# Patient Record
Sex: MALE | Race: WHITE | NOT HISPANIC OR LATINO | Employment: FULL TIME | URBAN - METROPOLITAN AREA
[De-identification: names, ages, dates, MRNs, and addresses within clinical notes are randomized per-mention and may not be internally consistent; named-entity substitution may affect disease eponyms.]

---

## 2018-06-09 ENCOUNTER — NURSE TRIAGE (OUTPATIENT)
Dept: ADMINISTRATIVE | Facility: CLINIC | Age: 53
End: 2018-06-09

## 2018-06-10 NOTE — TELEPHONE ENCOUNTER
Lower leg pain, getting dizzy when standing. BP 82/54 HR= ?   Rec EMS due to low BP and dizzy. Pt states states family will take him to ED. rec EMS if feeling too weak to stand/faint.   No PCP on file   Reason for Disposition   [1] Systolic BP < 90 AND [2] dizzy, lightheaded, or weak    Protocols used: ST LOW BLOOD PRESSURE-A-AH

## 2019-06-21 ENCOUNTER — OFFICE VISIT (OUTPATIENT)
Dept: WOUND CARE | Facility: HOSPITAL | Age: 54
End: 2019-06-21
Attending: NURSE PRACTITIONER
Payer: COMMERCIAL

## 2019-06-21 VITALS
SYSTOLIC BLOOD PRESSURE: 159 MMHG | TEMPERATURE: 98 F | RESPIRATION RATE: 20 BRPM | DIASTOLIC BLOOD PRESSURE: 92 MMHG | HEART RATE: 98 BPM

## 2019-06-21 DIAGNOSIS — L97.509 TYPE 2 DIABETES MELLITUS WITH FOOT ULCER, WITHOUT LONG-TERM CURRENT USE OF INSULIN: ICD-10-CM

## 2019-06-21 DIAGNOSIS — E11.621 TYPE 2 DIABETES MELLITUS WITH FOOT ULCER, WITHOUT LONG-TERM CURRENT USE OF INSULIN: ICD-10-CM

## 2019-06-21 DIAGNOSIS — L97.412 MIDFOOT ULCERATION, RIGHT, WITH FAT LAYER EXPOSED: ICD-10-CM

## 2019-06-21 DIAGNOSIS — L97.422 MIDFOOT ULCER, LEFT, WITH FAT LAYER EXPOSED: ICD-10-CM

## 2019-06-21 PROCEDURE — 97597 DBRDMT OPN WND 1ST 20 CM/<: CPT

## 2019-06-21 PROCEDURE — 99204 OFFICE O/P NEW MOD 45 MIN: CPT | Mod: 25

## 2019-06-21 RX ORDER — METFORMIN HYDROCHLORIDE 1000 MG/1
1000 TABLET ORAL 2 TIMES DAILY WITH MEALS
COMMUNITY

## 2019-06-21 RX ORDER — LOSARTAN POTASSIUM 100 MG/1
100 TABLET ORAL DAILY
COMMUNITY

## 2019-06-21 RX ORDER — ASPIRIN 81 MG/1
81 TABLET ORAL DAILY
COMMUNITY

## 2019-06-21 RX ORDER — CARVEDILOL 25 MG/1
25 TABLET ORAL 2 TIMES DAILY WITH MEALS
COMMUNITY

## 2019-06-21 RX ORDER — GABAPENTIN 300 MG/1
300 CAPSULE ORAL 3 TIMES DAILY
COMMUNITY

## 2019-06-21 NOTE — PROGRESS NOTES
[facility]  Wound Care  Progress Note    Problem List Items Addressed This Visit        Derm    Midfoot ulceration, right, with fat layer exposed    Midfoot ulcer, left, with fat layer exposed       Endocrine    Type 2 diabetes mellitus with foot ulcer, without long-term current use of insulin    Overview     HPI: 53 yr old male with history of DM-2, the bottoms of both of his feet are peeling with cracks in his feet    Location: left and right plantar mid foot    Duration: right: 5-24-19 and left: 6-17-19    Context: DFU    Associated Signs & Symptoms: denies pain    Timing: n/a    Severity: n/a    Quality: n/a    Modifying Factors: n/a             Relevant Medications    metFORMIN (GLUCOPHAGE) 1000 MG tablet         Physical Exam   Constitutional: He is oriented to person, place, and time. He appears well-developed and well-nourished.   HENT:   Head: Normocephalic.   Pulmonary/Chest: Effort normal.   Musculoskeletal: Normal range of motion.   Neurological: He is alert and oriented to person, place, and time.   Skin: Skin is warm and dry.   DFU's, perez grade 1's to left and right plantar mid feet, both with adipose exposed but shallow, pt is not on insulin   Psychiatric: He has a normal mood and affect. His behavior is normal. Judgment and thought content normal.   Vitals reviewed.    DNA culture obtained to right plantar foot, will e-scribe lotrisone cream to surface of feet and iodoflex to wound beds, keep clean and dry and will recheck in one week.  See wound doc progress notes. Documents will be scanned.        Bruna Mcgraw  [facility]

## 2019-06-28 ENCOUNTER — OFFICE VISIT (OUTPATIENT)
Dept: WOUND CARE | Facility: HOSPITAL | Age: 54
End: 2019-06-28
Attending: NURSE PRACTITIONER
Payer: COMMERCIAL

## 2019-06-28 VITALS — DIASTOLIC BLOOD PRESSURE: 96 MMHG | SYSTOLIC BLOOD PRESSURE: 189 MMHG | HEART RATE: 84 BPM

## 2019-06-28 DIAGNOSIS — L97.422 MIDFOOT ULCER, LEFT, WITH FAT LAYER EXPOSED: ICD-10-CM

## 2019-06-28 DIAGNOSIS — E11.621 TYPE 2 DIABETES MELLITUS WITH FOOT ULCER, WITHOUT LONG-TERM CURRENT USE OF INSULIN: Primary | ICD-10-CM

## 2019-06-28 DIAGNOSIS — L97.412 MIDFOOT ULCERATION, RIGHT, WITH FAT LAYER EXPOSED: ICD-10-CM

## 2019-06-28 DIAGNOSIS — L97.509 TYPE 2 DIABETES MELLITUS WITH FOOT ULCER, WITHOUT LONG-TERM CURRENT USE OF INSULIN: Primary | ICD-10-CM

## 2019-06-28 PROCEDURE — 99213 OFFICE O/P EST LOW 20 MIN: CPT

## 2019-06-28 NOTE — PROGRESS NOTES
[facility]  Wound Care  Progress Note    Problem List Items Addressed This Visit        Derm    Midfoot ulceration, right, with fat layer exposed    Midfoot ulcer, left, with fat layer exposed       Endocrine    Type 2 diabetes mellitus with foot ulcer, without long-term current use of insulin - Primary    Overview     HPI: 53 yr old male with history of DM-2, the bottoms of both of his feet are peeling with cracks in his feet    Location: left and right plantar mid foot    Duration: right: 5-24-19 and left: 6-17-19    Context: DFU    Associated Signs & Symptoms: denies pain    Timing: n/a    Severity: n/a    Quality: n/a    Modifying Factors: n/a    6-28-19: here for wound care for DFU's to plantar feet              Physical Exam   Constitutional: He is oriented to person, place, and time. He appears well-developed and well-nourished.   HENT:   Head: Normocephalic.   Pulmonary/Chest: Effort normal.   Musculoskeletal: Normal range of motion.   Neurological: He is alert and oriented to person, place, and time.   Skin: Skin is warm and dry.   DFU's, both perez grade 1's to left and right plantar feet, superficial today.   Psychiatric: He has a normal mood and affect. His behavior is normal. Judgment and thought content normal.   Vitals reviewed.    Feet are better today, continue lotrisone cream and will recheck in 2 weeks, culture was negative.  See wound doc progress notes. Documents will be scanned.        Bruna Mcgraw  [facility]

## 2024-03-14 ENCOUNTER — HOSPITAL ENCOUNTER (OUTPATIENT)
Dept: RADIOLOGY | Facility: HOSPITAL | Age: 59
Discharge: HOME | End: 2024-03-14
Attending: SURGERY
Payer: COMMERCIAL

## 2024-03-14 ENCOUNTER — OFFICE VISIT (OUTPATIENT)
Dept: WOUND CARE | Facility: HOSPITAL | Age: 59
End: 2024-03-14
Attending: SURGERY
Payer: COMMERCIAL

## 2024-03-14 VITALS — HEART RATE: 94 BPM | SYSTOLIC BLOOD PRESSURE: 140 MMHG | DIASTOLIC BLOOD PRESSURE: 99 MMHG

## 2024-03-14 DIAGNOSIS — M86.9 DIABETIC FOOT ULCER WITH OSTEOMYELITIS: ICD-10-CM

## 2024-03-14 DIAGNOSIS — L97.509 DIABETIC FOOT ULCER WITH OSTEOMYELITIS: ICD-10-CM

## 2024-03-14 DIAGNOSIS — E11.621 TYPE 2 DIABETES MELLITUS WITH FOOT ULCER, WITHOUT LONG-TERM CURRENT USE OF INSULIN: Primary | ICD-10-CM

## 2024-03-14 DIAGNOSIS — E11.621 DIABETIC FOOT ULCER WITH OSTEOMYELITIS: ICD-10-CM

## 2024-03-14 DIAGNOSIS — L97.522 ULCER OF TOE OF LEFT FOOT, WITH FAT LAYER EXPOSED: ICD-10-CM

## 2024-03-14 DIAGNOSIS — E11.69 DIABETIC FOOT ULCER WITH OSTEOMYELITIS: ICD-10-CM

## 2024-03-14 DIAGNOSIS — L97.509 TYPE 2 DIABETES MELLITUS WITH FOOT ULCER, WITHOUT LONG-TERM CURRENT USE OF INSULIN: Primary | ICD-10-CM

## 2024-03-14 PROCEDURE — 11042 DBRDMT SUBQ TIS 1ST 20SQCM/<: CPT

## 2024-03-14 PROCEDURE — 99214 OFFICE O/P EST MOD 30 MIN: CPT | Mod: 25

## 2024-03-14 PROCEDURE — 73630 X-RAY EXAM OF FOOT: CPT | Mod: 26,LT,, | Performed by: RADIOLOGY

## 2024-03-14 PROCEDURE — 99499 UNLISTED E&M SERVICE: CPT | Mod: ,,, | Performed by: SURGERY

## 2024-03-14 PROCEDURE — 73630 X-RAY EXAM OF FOOT: CPT | Mod: TC,LT

## 2024-03-14 NOTE — PROGRESS NOTES
[facility]  Wound Care  Progress Note    Problem List Items Addressed This Visit       Type 2 diabetes mellitus with foot ulcer, without long-term current use of insulin - Primary    Overview     HPI: 58 yr old male with history of DM-2 Presents with a left plantar diabetic ulcer over the 1st metatarsal head.  Patient states it has been there for about 3 months.  He did see Dr. Cochran and was referred back to wound care.  He was seen here back in 2019 for right foot ulcer.  Recent hemoglobin A1c was 6.7.  He does have good palpable pulses and good ABIs.  I did sharp debridement with curette today to include skin and subcutaneous tissue.  He will need to offload.  Silver alginate.  Left foot x-ray.    Location: left First metatarsal head    Duration:  3 months    Context: DFU    Associated Signs & Symptoms: denies pain    Timing: n/a    Severity: n/a    Quality: n/a    Modifying Factors:  diabetes            Other Visit Diagnoses       Ulcer of toe of left foot, with fat layer exposed        Relevant Orders    X-Ray Foot Complete 3 view Left    Diabetic foot ulcer with osteomyelitis        Relevant Orders    X-Ray Foot Complete 3 view Left            See wound doc progress notes. Documents will be scanned.        Brandon Bee Jr  [facility]

## 2024-03-21 ENCOUNTER — OFFICE VISIT (OUTPATIENT)
Dept: WOUND CARE | Facility: HOSPITAL | Age: 59
End: 2024-03-21
Attending: SURGERY
Payer: COMMERCIAL

## 2024-03-21 VITALS — SYSTOLIC BLOOD PRESSURE: 135 MMHG | HEART RATE: 92 BPM | DIASTOLIC BLOOD PRESSURE: 89 MMHG

## 2024-03-21 DIAGNOSIS — L97.509 TYPE 2 DIABETES MELLITUS WITH FOOT ULCER, WITHOUT LONG-TERM CURRENT USE OF INSULIN: Primary | ICD-10-CM

## 2024-03-21 DIAGNOSIS — E11.621 TYPE 2 DIABETES MELLITUS WITH FOOT ULCER, WITHOUT LONG-TERM CURRENT USE OF INSULIN: Primary | ICD-10-CM

## 2024-03-21 DIAGNOSIS — L97.522 ULCER OF TOE OF LEFT FOOT, WITH FAT LAYER EXPOSED: ICD-10-CM

## 2024-03-21 PROCEDURE — 99499 UNLISTED E&M SERVICE: CPT | Mod: ,,, | Performed by: SURGERY

## 2024-03-21 PROCEDURE — 11042 DBRDMT SUBQ TIS 1ST 20SQCM/<: CPT

## 2024-03-21 NOTE — PROGRESS NOTES
[facility]  Wound Care  Progress Note    Problem List Items Addressed This Visit       Type 2 diabetes mellitus with foot ulcer, without long-term current use of insulin - Primary    Overview     HPI: 58 yr old male with history of DM-2 Presents with a left plantar diabetic ulcer over the 1st metatarsal head.  Patient states it has been there for about 3 months.  He did see Dr. Cochran and was referred back to wound care.  He was seen here back in 2019 for right foot ulcer.  Recent hemoglobin A1c was 6.7.  He does have good palpable pulses and good ABIs.  I did sharp debridement with curette today to include skin and subcutaneous tissue.  He will need to offload.  Silver alginate.  Left foot x-ray.    Location: left First metatarsal head    Duration:  3 months    Context: DFU    Associated Signs & Symptoms: denies pain    Timing: n/a    Severity: n/a    Quality: n/a    Modifying Factors:  diabetes           Current Assessment & Plan     Wound about the same today debridement performed with curette skin and subcu levels to bleeding tissue.  Patient evidently does not have coverage for his insurance for this heal by it over the Internet.  Continue with offloading shoe            See wound doc progress notes. Documents will be scanned.        Alexander Stinson  [facility]

## 2024-03-21 NOTE — ASSESSMENT & PLAN NOTE
Wound about the same today debridement performed with curette skin and subcu levels to bleeding tissue.  Patient evidently does not have coverage for his insurance for this heal by it over the Internet.  Continue with offloading shoe

## 2024-03-28 ENCOUNTER — OFFICE VISIT (OUTPATIENT)
Dept: WOUND CARE | Facility: HOSPITAL | Age: 59
End: 2024-03-28
Attending: SURGERY
Payer: COMMERCIAL

## 2024-03-28 VITALS — DIASTOLIC BLOOD PRESSURE: 85 MMHG | SYSTOLIC BLOOD PRESSURE: 130 MMHG | HEART RATE: 80 BPM

## 2024-03-28 DIAGNOSIS — E11.621 TYPE 2 DIABETES MELLITUS WITH FOOT ULCER, WITHOUT LONG-TERM CURRENT USE OF INSULIN: Primary | ICD-10-CM

## 2024-03-28 DIAGNOSIS — L97.522 ULCER OF TOE OF LEFT FOOT, WITH FAT LAYER EXPOSED: ICD-10-CM

## 2024-03-28 DIAGNOSIS — L97.509 TYPE 2 DIABETES MELLITUS WITH FOOT ULCER, WITHOUT LONG-TERM CURRENT USE OF INSULIN: Primary | ICD-10-CM

## 2024-03-28 PROCEDURE — 11042 DBRDMT SUBQ TIS 1ST 20SQCM/<: CPT

## 2024-03-28 PROCEDURE — 99499 UNLISTED E&M SERVICE: CPT | Mod: ,,, | Performed by: SURGERY

## 2024-03-28 NOTE — PROGRESS NOTES
[facility]  Wound Care  Progress Note    Problem List Items Addressed This Visit       Type 2 diabetes mellitus with foot ulcer, without long-term current use of insulin - Primary    Overview     HPI: 58 yr old male with history of DM-2 Presents with a left plantar diabetic ulcer over the 1st metatarsal head.  Patient states it has been there for about 3 months.  He did see Dr. Cochran and was referred back to wound care.  He was seen here back in 2019 for right foot ulcer.  Recent hemoglobin A1c was 6.7.  He does have good palpable pulses and good ABIs.  I did sharp debridement with curette today to include skin and subcutaneous tissue.  He will need to offload.  Silver alginate.  Left foot x-ray 03/12/2024 did not show any bony erosions or evidence of osteomyelitis.  Continue silver alginate.    Location: left First metatarsal head    Duration:  3 months    Context: DFU    Associated Signs & Symptoms: denies pain    Timing: n/a    Severity: n/a    Quality: n/a    Modifying Factors:  diabetes              See wound doc progress notes. Documents will be scanned.        Brandon Bee Jr  [facility]

## 2024-04-04 ENCOUNTER — OFFICE VISIT (OUTPATIENT)
Dept: WOUND CARE | Facility: HOSPITAL | Age: 59
End: 2024-04-04
Attending: SURGERY
Payer: COMMERCIAL

## 2024-04-04 VITALS — HEART RATE: 82 BPM | SYSTOLIC BLOOD PRESSURE: 130 MMHG | DIASTOLIC BLOOD PRESSURE: 80 MMHG

## 2024-04-04 DIAGNOSIS — L97.509 TYPE 2 DIABETES MELLITUS WITH FOOT ULCER, WITHOUT LONG-TERM CURRENT USE OF INSULIN: Primary | ICD-10-CM

## 2024-04-04 DIAGNOSIS — L97.522 ULCER OF TOE OF LEFT FOOT, WITH FAT LAYER EXPOSED: ICD-10-CM

## 2024-04-04 DIAGNOSIS — E11.621 TYPE 2 DIABETES MELLITUS WITH FOOT ULCER, WITHOUT LONG-TERM CURRENT USE OF INSULIN: Primary | ICD-10-CM

## 2024-04-04 PROCEDURE — 99499 UNLISTED E&M SERVICE: CPT | Mod: ,,, | Performed by: SURGERY

## 2024-04-04 PROCEDURE — 11042 DBRDMT SUBQ TIS 1ST 20SQCM/<: CPT

## 2024-04-04 NOTE — PROGRESS NOTES
[facility]  Wound Care  Progress Note    Problem List Items Addressed This Visit       Type 2 diabetes mellitus with foot ulcer, without long-term current use of insulin - Primary    Overview     HPI: 58 yr old male with history of DM-2 Presents with a left plantar diabetic ulcer over the 1st metatarsal head.  Patient states it has been there for about 3 months.  He did see Dr. Cochran and was referred back to wound care.  He was seen here back in 2019 for right foot ulcer.  Recent hemoglobin A1c was 6.7.  He does have good palpable pulses and good ABIs.  I did sharp debridement with curette today to include skin and subcutaneous tissue.  He will need to offload.  Silver alginate.  Left foot x-ray 03/12/2024 did not show any bony erosions or evidence of osteomyelitis.  Continue silver alginate.    Location: left First metatarsal head    Duration:  3 months    Context: DFU    Associated Signs & Symptoms: denies pain    Timing: n/a    Severity: n/a    Quality: n/a    Modifying Factors:  diabetes           Current Assessment & Plan     Wound improving.  Patient compliant with offloading.  Patient has been using rolling scooter.  Wound bed clean and granulating.  Wound decreased in size.  No real callus formation.  No drainage or concern for infection.  Continued sharp debridement is needed.  Continue offloading.  Importance of diabetic control and offloading discussed with patient.  Continue Promogran            See wound doc progress notes. Documents will be scanned.        Aidan Scott  [facility]

## 2024-04-04 NOTE — ASSESSMENT & PLAN NOTE
Wound improving.  Patient compliant with offloading.  Patient has been using rolling scooter.  Wound bed clean and granulating.  Wound decreased in size.  No real callus formation.  No drainage or concern for infection.  Continued sharp debridement is needed.  Continue offloading.  Importance of diabetic control and offloading discussed with patient.  Continue Promogran

## 2024-04-04 NOTE — PROGRESS NOTES
[facility]  Wound Care  Progress Note    Problem List Items Addressed This Visit    None      See wound doc progress notes. Documents will be scanned.        Aidan PETERS [facility]  [facility] Note    Problem List Items Addressed This Visit    None      See wound doc progress notes. Documents will be scanned.        Aidan PETERS Marino [facility]

## 2024-04-11 ENCOUNTER — OFFICE VISIT (OUTPATIENT)
Dept: WOUND CARE | Facility: HOSPITAL | Age: 59
End: 2024-04-11
Attending: SURGERY
Payer: COMMERCIAL

## 2024-04-11 VITALS
DIASTOLIC BLOOD PRESSURE: 77 MMHG | RESPIRATION RATE: 18 BRPM | TEMPERATURE: 98 F | HEART RATE: 78 BPM | SYSTOLIC BLOOD PRESSURE: 126 MMHG

## 2024-04-11 DIAGNOSIS — E11.621 TYPE 2 DIABETES MELLITUS WITH FOOT ULCER, WITHOUT LONG-TERM CURRENT USE OF INSULIN: Primary | ICD-10-CM

## 2024-04-11 DIAGNOSIS — L97.522 ULCER OF TOE OF LEFT FOOT, WITH FAT LAYER EXPOSED: ICD-10-CM

## 2024-04-11 DIAGNOSIS — L97.509 TYPE 2 DIABETES MELLITUS WITH FOOT ULCER, WITHOUT LONG-TERM CURRENT USE OF INSULIN: Primary | ICD-10-CM

## 2024-04-11 PROCEDURE — 99499 UNLISTED E&M SERVICE: CPT | Mod: ,,, | Performed by: SURGERY

## 2024-04-11 PROCEDURE — 11042 DBRDMT SUBQ TIS 1ST 20SQCM/<: CPT

## 2024-04-11 NOTE — ASSESSMENT & PLAN NOTE
Wound slowly improving.  No necrotic tissue noted.  Curette used to debride skin and subcu levels to bleeding tissue.  Continue with silver product may want to consider switching to collagen in the next week or 2.  If it does not improve consideration to culture.

## 2024-04-11 NOTE — PROGRESS NOTES
[facility]  Wound Care  Progress Note    Problem List Items Addressed This Visit       Type 2 diabetes mellitus with foot ulcer, without long-term current use of insulin - Primary    Overview     HPI: 58 yr old male with history of DM-2 Presents with a left plantar diabetic ulcer over the 1st metatarsal head.  Patient states it has been there for about 3 months.  He did see Dr. Cochran and was referred back to wound care.  He was seen here back in 2019 for right foot ulcer.  Recent hemoglobin A1c was 6.7.  He does have good palpable pulses and good ABIs.  I did sharp debridement with curette today to include skin and subcutaneous tissue.  He will need to offload.  Silver alginate.  Left foot x-ray 03/12/2024 did not show any bony erosions or evidence of osteomyelitis.  Continue silver alginate.    Location: left First metatarsal head    Duration:  3 months    Context: DFU    Associated Signs & Symptoms: denies pain    Timing: n/a    Severity: n/a    Quality: n/a    Modifying Factors:  diabetes           Current Assessment & Plan     Wound slowly improving.  No necrotic tissue noted.  Curette used to debride skin and subcu levels to bleeding tissue.  Continue with silver product may want to consider switching to collagen in the next week or 2.  If it does not improve consideration to culture.         Ulcer of toe of left foot, with fat layer exposed       See wound doc progress notes. Documents will be scanned.        Alexander Stinson  [facility]    
See wound care assessment documentation in chart review. Scanned under media tab.       
0

## 2024-04-18 ENCOUNTER — OFFICE VISIT (OUTPATIENT)
Dept: WOUND CARE | Facility: HOSPITAL | Age: 59
End: 2024-04-18
Attending: SURGERY
Payer: COMMERCIAL

## 2024-04-18 VITALS
RESPIRATION RATE: 18 BRPM | DIASTOLIC BLOOD PRESSURE: 75 MMHG | HEART RATE: 73 BPM | TEMPERATURE: 98 F | SYSTOLIC BLOOD PRESSURE: 123 MMHG

## 2024-04-18 DIAGNOSIS — L97.522 ULCER OF TOE OF LEFT FOOT, WITH FAT LAYER EXPOSED: ICD-10-CM

## 2024-04-18 DIAGNOSIS — L97.509 TYPE 2 DIABETES MELLITUS WITH FOOT ULCER, WITHOUT LONG-TERM CURRENT USE OF INSULIN: Primary | ICD-10-CM

## 2024-04-18 DIAGNOSIS — E11.621 TYPE 2 DIABETES MELLITUS WITH FOOT ULCER, WITHOUT LONG-TERM CURRENT USE OF INSULIN: Primary | ICD-10-CM

## 2024-04-18 PROCEDURE — 99499 UNLISTED E&M SERVICE: CPT | Mod: ,,, | Performed by: SURGERY

## 2024-04-18 PROCEDURE — 11042 DBRDMT SUBQ TIS 1ST 20SQCM/<: CPT

## 2024-04-18 NOTE — PROGRESS NOTES
[facility]  Wound Care  Progress Note    Problem List Items Addressed This Visit       Type 2 diabetes mellitus with foot ulcer, without long-term current use of insulin - Primary    Overview     HPI: 58 yr old male with history of DM-2 Presents with a left plantar diabetic ulcer over the 1st metatarsal head.  Patient states it has been there for about 3 months.  He did see Dr. Cochran and was referred back to wound care.  He was seen here back in 2019 for right foot ulcer.  Recent hemoglobin A1c was 6.7.  He does have good palpable pulses and good ABIs.  I did sharp debridement with curette today to include skin and subcutaneous tissue.  He will need to offload.  Silver alginate.  Left foot x-ray 03/12/2024 did not show any bony erosions or evidence of osteomyelitis.  Continue silver alginate.  DNA culture obtained today.    Location: left First metatarsal head    Duration:  3 months    Context: DFU    Associated Signs & Symptoms: denies pain    Timing: n/a    Severity: n/a    Quality: n/a    Modifying Factors:  diabetes              See wound doc progress notes. Documents will be scanned.        Brandon Bee Jr  [facility]

## 2024-04-25 ENCOUNTER — OFFICE VISIT (OUTPATIENT)
Dept: WOUND CARE | Facility: HOSPITAL | Age: 59
End: 2024-04-25
Attending: SURGERY
Payer: COMMERCIAL

## 2024-04-25 VITALS — DIASTOLIC BLOOD PRESSURE: 70 MMHG | HEART RATE: 70 BPM | SYSTOLIC BLOOD PRESSURE: 120 MMHG

## 2024-04-25 DIAGNOSIS — E11.621 TYPE 2 DIABETES MELLITUS WITH FOOT ULCER, WITHOUT LONG-TERM CURRENT USE OF INSULIN: ICD-10-CM

## 2024-04-25 DIAGNOSIS — L97.522 ULCER OF TOE OF LEFT FOOT, WITH FAT LAYER EXPOSED: ICD-10-CM

## 2024-04-25 DIAGNOSIS — L97.522 PLANTAR ULCER, LEFT, WITH FAT LAYER EXPOSED: Primary | ICD-10-CM

## 2024-04-25 DIAGNOSIS — L97.509 TYPE 2 DIABETES MELLITUS WITH FOOT ULCER, WITHOUT LONG-TERM CURRENT USE OF INSULIN: ICD-10-CM

## 2024-04-25 PROCEDURE — 99499 UNLISTED E&M SERVICE: CPT | Mod: ,,, | Performed by: SURGERY

## 2024-04-25 PROCEDURE — 11042 DBRDMT SUBQ TIS 1ST 20SQCM/<: CPT

## 2024-04-25 NOTE — PROGRESS NOTES
[facility]  Wound Care  Progress Note    Problem List Items Addressed This Visit       Type 2 diabetes mellitus with foot ulcer, without long-term current use of insulin    Overview     HPI: 58 yr old male with history of DM-2 Presents with a left plantar diabetic ulcer over the 1st metatarsal head.  Patient states it has been there for about 3 months.  He did see Dr. Cochran and was referred back to wound care.  He was seen here back in 2019 for right foot ulcer.  Recent hemoglobin A1c was 6.7.  He does have good palpable pulses and good ABIs.  I did sharp debridement with curette today to include skin and subcutaneous tissue.  He will need to offload.  Silver alginate.  Left foot x-ray 03/12/2024 did not show any bony erosions or evidence of osteomyelitis.  Continue silver alginate.  DNA culture obtained today.    Location: left First metatarsal head    Duration:  3 months    Context: DFU    Associated Signs & Symptoms: denies pain    Timing: n/a    Severity: n/a    Quality: n/a    Modifying Factors:  diabetes           Current Assessment & Plan     The left foot plantar ulcer is becoming more shallow.  There is some soft callus that was removed with a curette.  The base of the wound is debrided with a curette skin and subcu levels to bleeding tissue.  DNA has come back but sensitivities are pending.  Once he is back compound will be ordered.  Continue with silver until then.  Continue with offloading.         Plantar ulcer, left, with fat layer exposed - Primary       See wound doc progress notes. Documents will be scanned.        Alexander Stinson  [facility]

## 2024-04-25 NOTE — ASSESSMENT & PLAN NOTE
The left foot plantar ulcer is becoming more shallow.  There is some soft callus that was removed with a curette.  The base of the wound is debrided with a curette skin and subcu levels to bleeding tissue.  DNA has come back but sensitivities are pending.  Once he is back compound will be ordered.  Continue with silver until then.  Continue with offloading.

## 2024-05-02 ENCOUNTER — OFFICE VISIT (OUTPATIENT)
Dept: WOUND CARE | Facility: HOSPITAL | Age: 59
End: 2024-05-02
Attending: SURGERY
Payer: COMMERCIAL

## 2024-05-02 VITALS — DIASTOLIC BLOOD PRESSURE: 76 MMHG | HEART RATE: 70 BPM | SYSTOLIC BLOOD PRESSURE: 120 MMHG

## 2024-05-02 DIAGNOSIS — L97.522 PLANTAR ULCER, LEFT, WITH FAT LAYER EXPOSED: ICD-10-CM

## 2024-05-02 DIAGNOSIS — L97.509 TYPE 2 DIABETES MELLITUS WITH FOOT ULCER, WITHOUT LONG-TERM CURRENT USE OF INSULIN: Primary | ICD-10-CM

## 2024-05-02 DIAGNOSIS — E11.621 TYPE 2 DIABETES MELLITUS WITH FOOT ULCER, WITHOUT LONG-TERM CURRENT USE OF INSULIN: Primary | ICD-10-CM

## 2024-05-02 PROCEDURE — 11042 DBRDMT SUBQ TIS 1ST 20SQCM/<: CPT

## 2024-05-02 PROCEDURE — 99499 UNLISTED E&M SERVICE: CPT | Mod: ,,, | Performed by: SURGERY

## 2024-05-02 NOTE — PROGRESS NOTES
[facility]  Wound Care  Progress Note    Problem List Items Addressed This Visit       Type 2 diabetes mellitus with foot ulcer, without long-term current use of insulin - Primary    Overview     HPI: 58 yr old male with history of DM-2 Presents with a left plantar diabetic ulcer over the 1st metatarsal head.  Patient states it has been there for about 3 months.  He did see Dr. Cochran and was referred back to wound care.  He was seen here back in 2019 for right foot ulcer.  Recent hemoglobin A1c was 6.7.  He does have good palpable pulses and good ABIs.  I did sharp debridement with curette today to include skin and subcutaneous tissue.  He will need to offload.  Silver alginate.  Left foot x-ray 03/12/2024 did not show any bony erosions or evidence of osteomyelitis.  Continue silver alginate.  DNA culture obtained.    Antibiotic compound being used.  He is also using a knee scooter so he does not put any weight.    Location: left First metatarsal head    Duration:  months    Context: DFU    Associated Signs & Symptoms: denies pain    Timing: n/a    Severity: n/a    Quality: n/a    Modifying Factors:  diabetes              See wound doc progress notes. Documents will be scanned.        Brandon Bee Jr  [facility]

## 2024-05-09 ENCOUNTER — OFFICE VISIT (OUTPATIENT)
Dept: WOUND CARE | Facility: HOSPITAL | Age: 59
End: 2024-05-09
Attending: SURGERY
Payer: COMMERCIAL

## 2024-05-09 VITALS
HEART RATE: 75 BPM | TEMPERATURE: 98 F | RESPIRATION RATE: 17 BRPM | DIASTOLIC BLOOD PRESSURE: 77 MMHG | SYSTOLIC BLOOD PRESSURE: 116 MMHG

## 2024-05-09 DIAGNOSIS — L97.522 PLANTAR ULCER, LEFT, WITH FAT LAYER EXPOSED: ICD-10-CM

## 2024-05-09 DIAGNOSIS — L97.509 TYPE 2 DIABETES MELLITUS WITH FOOT ULCER, WITHOUT LONG-TERM CURRENT USE OF INSULIN: Primary | ICD-10-CM

## 2024-05-09 DIAGNOSIS — E11.621 TYPE 2 DIABETES MELLITUS WITH FOOT ULCER, WITHOUT LONG-TERM CURRENT USE OF INSULIN: Primary | ICD-10-CM

## 2024-05-09 PROCEDURE — 11042 DBRDMT SUBQ TIS 1ST 20SQCM/<: CPT

## 2024-05-09 PROCEDURE — 99499 UNLISTED E&M SERVICE: CPT | Mod: ,,, | Performed by: SURGERY

## 2024-05-09 NOTE — PROGRESS NOTES
[facility]  Wound Care  Progress Note    Problem List Items Addressed This Visit       Type 2 diabetes mellitus with foot ulcer, without long-term current use of insulin - Primary    Overview     HPI: 58 yr old male with history of DM-2 Presents with a left plantar diabetic ulcer over the 1st metatarsal head.  Patient states it has been there for about 3 months.  He did see Dr. Cochran and was referred back to wound care.  He was seen here back in 2019 for right foot ulcer.  Recent hemoglobin A1c was 6.7.  He does have good palpable pulses and good ABIs.  I did sharp debridement with curette today to include skin and subcutaneous tissue.  He will need to offload.  Silver alginate.  Left foot x-ray 03/12/2024 did not show any bony erosions or evidence of osteomyelitis.  Continue silver alginate.  DNA culture obtained.    Antibiotic compound being used.  He is also using a knee scooter so he does not put any weight.    Location: left First metatarsal head    Duration:  months    Context: DFU    Associated Signs & Symptoms: denies pain    Timing: n/a    Severity: n/a    Quality: n/a    Modifying Factors:  diabetes           Current Assessment & Plan     Wound is clean and granulating.  No concerning drainage.  No exposed bone.  No necrotic tissue.  Minimal callus formation at the wound edges.  Continued sharp debridement is needed.  Continue topical antibiotic powder.  Patient continues to use scooter for offloading.  The importance of diabetic control also stressed to the patient.  Patient instructed to follow-up with endocrinologist            See wound doc progress notes. Documents will be scanned.        Aidan Scott  [facility]

## 2024-05-09 NOTE — ASSESSMENT & PLAN NOTE
Wound is clean and granulating.  No concerning drainage.  No exposed bone.  No necrotic tissue.  Minimal callus formation at the wound edges.  Continued sharp debridement is needed.  Continue topical antibiotic powder.  Patient continues to use scooter for offloading.  The importance of diabetic control also stressed to the patient.  Patient instructed to follow-up with endocrinologist

## 2024-05-16 ENCOUNTER — OFFICE VISIT (OUTPATIENT)
Dept: WOUND CARE | Facility: HOSPITAL | Age: 59
End: 2024-05-16
Attending: SURGERY
Payer: COMMERCIAL

## 2024-05-16 VITALS
RESPIRATION RATE: 17 BRPM | SYSTOLIC BLOOD PRESSURE: 120 MMHG | DIASTOLIC BLOOD PRESSURE: 75 MMHG | TEMPERATURE: 98 F | HEART RATE: 78 BPM

## 2024-05-16 DIAGNOSIS — L97.522 PLANTAR ULCER, LEFT, WITH FAT LAYER EXPOSED: ICD-10-CM

## 2024-05-16 DIAGNOSIS — L97.509 TYPE 2 DIABETES MELLITUS WITH FOOT ULCER, WITHOUT LONG-TERM CURRENT USE OF INSULIN: Primary | ICD-10-CM

## 2024-05-16 DIAGNOSIS — E11.621 TYPE 2 DIABETES MELLITUS WITH FOOT ULCER, WITHOUT LONG-TERM CURRENT USE OF INSULIN: Primary | ICD-10-CM

## 2024-05-16 PROCEDURE — 99499 UNLISTED E&M SERVICE: CPT | Mod: ,,, | Performed by: SURGERY

## 2024-05-16 PROCEDURE — 11042 DBRDMT SUBQ TIS 1ST 20SQCM/<: CPT

## 2024-05-16 NOTE — ASSESSMENT & PLAN NOTE
Wound is clean and granulating.  No concerning drainage.  No exposed bone.  No necrotic tissue.  Minimal callus formation at the wound edges.  Continued sharp debridement is needed.  Continue topical antibiotic powder.  Patient continues to use scooter for offloading.  The importance of diabetic control also stressed to the patient.  Patient instructed to follow-up with endocrinologist      0 = independent

## 2024-05-16 NOTE — PROGRESS NOTES
[facility]  Wound Care  Progress Note    Problem List Items Addressed This Visit       Type 2 diabetes mellitus with foot ulcer, without long-term current use of insulin - Primary    Overview     HPI: 58 yr old male with history of DM-2 Presents with a left plantar diabetic ulcer over the 1st metatarsal head.  Patient states it has been there for about 3 months.  He did see Dr. Cochran and was referred back to wound care.  He was seen here back in 2019 for right foot ulcer.  Recent hemoglobin A1c was 6.7.  He does have good palpable pulses and good ABIs.  I did sharp debridement with curette today to include skin and subcutaneous tissue.  He will need to offload.  Silver alginate.  Left foot x-ray 03/12/2024 did not show any bony erosions or evidence of osteomyelitis.  Continue silver alginate.  DNA culture obtained.    Antibiotic compound being used.  He is also using a knee scooter so he does not put any weight.    Location: left First metatarsal head    Duration:  months    Context: DFU    Associated Signs & Symptoms: denies pain    Timing: n/a    Severity: n/a    Quality: n/a    Modifying Factors:  diabetes           Current Assessment & Plan     Wound is clean and granulating.  No concerning drainage.  No exposed bone.  No necrotic tissue.  Minimal callus formation at the wound edges.  Continued sharp debridement is needed.  Continue topical antibiotic powder.  Patient continues to use scooter for offloading.  The importance of diabetic control also stressed to the patient.  Patient instructed to follow-up with endocrinologist            Plantar ulcer, left, with fat layer exposed       See wound doc progress notes. Documents will be scanned.        Brandon Bee Jr  [facility]

## 2024-05-23 ENCOUNTER — OFFICE VISIT (OUTPATIENT)
Dept: WOUND CARE | Facility: HOSPITAL | Age: 59
End: 2024-05-23
Attending: SURGERY
Payer: COMMERCIAL

## 2024-05-23 VITALS
SYSTOLIC BLOOD PRESSURE: 133 MMHG | HEART RATE: 75 BPM | TEMPERATURE: 97 F | DIASTOLIC BLOOD PRESSURE: 76 MMHG | RESPIRATION RATE: 18 BRPM

## 2024-05-23 DIAGNOSIS — E11.621 TYPE 2 DIABETES MELLITUS WITH FOOT ULCER, WITHOUT LONG-TERM CURRENT USE OF INSULIN: ICD-10-CM

## 2024-05-23 DIAGNOSIS — L97.509 TYPE 2 DIABETES MELLITUS WITH FOOT ULCER, WITHOUT LONG-TERM CURRENT USE OF INSULIN: ICD-10-CM

## 2024-05-23 DIAGNOSIS — L97.522 PLANTAR ULCER, LEFT, WITH FAT LAYER EXPOSED: Primary | ICD-10-CM

## 2024-05-23 PROCEDURE — 99499 UNLISTED E&M SERVICE: CPT | Mod: ,,, | Performed by: SURGERY

## 2024-05-23 PROCEDURE — 11042 DBRDMT SUBQ TIS 1ST 20SQCM/<: CPT

## 2024-05-23 NOTE — PROGRESS NOTES
[facility]  Wound Care  Progress Note    Problem List Items Addressed This Visit       Type 2 diabetes mellitus with foot ulcer, without long-term current use of insulin - Primary    Overview     HPI: 58 yr old male with history of DM-2 Presents with a left plantar diabetic ulcer over the 1st metatarsal head.  Patient states it has been there for about 3 months.  He did see Dr. Cochran and was referred back to wound care.  He was seen here back in 2019 for right foot ulcer.  Recent hemoglobin A1c was 6.7.  He does have good palpable pulses and good ABIs.  I did sharp debridement with curette today to include skin and subcutaneous tissue.  He will need to offload.  Silver alginate.  Left foot x-ray 03/12/2024 did not show any bony erosions or evidence of osteomyelitis.  Continue silver alginate.  DNA culture obtained.    Antibiotic compound being used.  He is also using a knee scooter so he does not put any weight.    Location: left First metatarsal head    Duration:  months    Context: DFU    Associated Signs & Symptoms: denies pain    Timing: n/a    Severity: n/a    Quality: n/a    Modifying Factors:  diabetes           Current Assessment & Plan     Wound is clean and granulating.  No concerning drainage.  No exposed bone.  No necrotic tissue.  Minimal callus formation at the wound edges.  Continued sharp debridement is needed.  Continue topical antibiotic powder.  Patient continues to use scooter for offloading.  The importance of diabetic control also stressed to the patient.  Patient instructed to follow-up with endocrinologist               See wound doc progress notes. Documents will be scanned.        Brandon Bee Jr  [facility]

## 2024-05-30 ENCOUNTER — OFFICE VISIT (OUTPATIENT)
Dept: WOUND CARE | Facility: HOSPITAL | Age: 59
End: 2024-05-30
Attending: SURGERY
Payer: COMMERCIAL

## 2024-05-30 VITALS — SYSTOLIC BLOOD PRESSURE: 130 MMHG | HEART RATE: 70 BPM | DIASTOLIC BLOOD PRESSURE: 70 MMHG

## 2024-05-30 DIAGNOSIS — L97.509 TYPE 2 DIABETES MELLITUS WITH FOOT ULCER, WITHOUT LONG-TERM CURRENT USE OF INSULIN: Primary | ICD-10-CM

## 2024-05-30 DIAGNOSIS — L97.522 PLANTAR ULCER, LEFT, WITH FAT LAYER EXPOSED: ICD-10-CM

## 2024-05-30 DIAGNOSIS — E11.621 TYPE 2 DIABETES MELLITUS WITH FOOT ULCER, WITHOUT LONG-TERM CURRENT USE OF INSULIN: Primary | ICD-10-CM

## 2024-05-30 PROBLEM — L97.422: Status: RESOLVED | Noted: 2019-06-21 | Resolved: 2024-05-30

## 2024-05-30 PROBLEM — L97.412: Status: RESOLVED | Noted: 2019-06-21 | Resolved: 2024-05-30

## 2024-05-30 PROCEDURE — 99499 UNLISTED E&M SERVICE: CPT | Mod: ,,, | Performed by: SURGERY

## 2024-05-30 PROCEDURE — 11042 DBRDMT SUBQ TIS 1ST 20SQCM/<: CPT

## 2024-05-30 NOTE — ASSESSMENT & PLAN NOTE
Wound is improved.    Excisional debridement performed to maintain active phase of wound healing.    Continue offloading with knee scooter and ortho wedge healing shoe.  Patient given instructions to obtain ortho wedge healing shoe.

## 2024-05-30 NOTE — ASSESSMENT & PLAN NOTE
HPI: 58 yr old male with history of DM-2 Presents with a left plantar diabetic ulcer over the 1st metatarsal head.  Patient states it has been there for about 3 months.  He did see Dr. Cochran and was referred back to wound care.  He was seen here back in 2019 for right foot ulcer.  Recent hemoglobin A1c was 6.7.  He does have good palpable pulses and good ABIs.  I did sharp debridement with curette today to include skin and subcutaneous tissue.  He will need to offload.  Silver alginate.  Left foot x-ray 03/12/2024 did not show any bony erosions or evidence of osteomyelitis.  Continue silver alginate.  DNA culture obtained.    Antibiotic compound being used.  He is also using a knee scooter so he does not put any weight.

## 2024-05-30 NOTE — ASSESSMENT & PLAN NOTE
Patient's sugars are not controlled.  His sugars are running 200 in the morning.  He is due to see his endocrinologist next week.

## 2024-05-30 NOTE — PROGRESS NOTES
[facility]  Wound Care  Progress Note    Problem List Items Addressed This Visit          Endocrine    Type 2 diabetes mellitus with foot ulcer, without long-term current use of insulin - Primary    Overview     HPI: 58 yr old male with history of DM-2 Presents with a left plantar diabetic ulcer over the 1st metatarsal head.  Patient states it has been there for about 3 months.  He did see Dr. Cochran and was referred back to wound care.  He was seen here back in 2019 for right foot ulcer.  Recent hemoglobin A1c was 6.7.  He does have good palpable pulses and good ABIs.  I did sharp debridement with curette today to include skin and subcutaneous tissue.  He will need to offload.  Silver alginate.  Left foot x-ray 03/12/2024 did not show any bony erosions or evidence of osteomyelitis.  Continue silver alginate.  DNA culture obtained.    Antibiotic compound being used.  He is also using a knee scooter so he does not put any weight.    Location: left First metatarsal head    Duration:  months    Context: DFU    Associated Signs & Symptoms: denies pain    Timing: n/a    Severity: n/a    Quality: n/a    Modifying Factors:  diabetes           Current Assessment & Plan     Patient's sugars are not controlled.  His sugars are running 200 in the morning.  He is due to see his endocrinologist next week.            Orthopedic    Plantar ulcer, left, with fat layer exposed    Overview     HPI: 58 yr old male with history of DM-2 Presents with a left plantar diabetic ulcer over the 1st metatarsal head.  Patient states it has been there for about 3 months.  He did see Dr. Cochran and was referred back to wound care.  He was seen here back in 2019 for right foot ulcer.  Recent hemoglobin A1c was 6.7.  He does have good palpable pulses and good ABIs.  I did sharp debridement with curette today to include skin and subcutaneous tissue.  He will need to offload.  Silver alginate.  Left foot x-ray 03/12/2024 did not  show any bony erosions or evidence of osteomyelitis.  Continue silver alginate.  DNA culture obtained.    Antibiotic compound being used.  He is also using a knee scooter so he does not put any weight.         Current Assessment & Plan     Wound is improved.    Excisional debridement performed to maintain active phase of wound healing.    Continue offloading with knee scooter and ortho wedge healing shoe.  Patient given instructions to obtain ortho wedge healing shoe.            See wound doc progress notes. Documents will be scanned.        Gamaliel Kong  [facility]

## 2024-06-06 ENCOUNTER — OFFICE VISIT (OUTPATIENT)
Dept: WOUND CARE | Facility: HOSPITAL | Age: 59
End: 2024-06-06
Attending: SURGERY
Payer: COMMERCIAL

## 2024-06-06 VITALS — DIASTOLIC BLOOD PRESSURE: 76 MMHG | HEART RATE: 78 BPM | SYSTOLIC BLOOD PRESSURE: 133 MMHG

## 2024-06-06 DIAGNOSIS — L97.509 TYPE 2 DIABETES MELLITUS WITH FOOT ULCER, WITHOUT LONG-TERM CURRENT USE OF INSULIN: ICD-10-CM

## 2024-06-06 DIAGNOSIS — L97.522 PLANTAR ULCER, LEFT, WITH FAT LAYER EXPOSED: Primary | ICD-10-CM

## 2024-06-06 DIAGNOSIS — E11.621 TYPE 2 DIABETES MELLITUS WITH FOOT ULCER, WITHOUT LONG-TERM CURRENT USE OF INSULIN: ICD-10-CM

## 2024-06-06 PROCEDURE — 11042 DBRDMT SUBQ TIS 1ST 20SQCM/<: CPT

## 2024-06-06 PROCEDURE — 99499 UNLISTED E&M SERVICE: CPT | Mod: ,,, | Performed by: SURGERY

## 2024-06-06 NOTE — ASSESSMENT & PLAN NOTE
Wound is clean and granulating.  Wound is superficial.  No significant callus formation.  Debridement is needed.  Continue current dressing.  Patient has been compliant with offloading using scooter and shoe.  Patient has follow-up with endocrinologist this week regarding blood sugars.

## 2024-06-06 NOTE — PROGRESS NOTES
[facility]  Wound Care  Progress Note    Problem List Items Addressed This Visit       Plantar ulcer, left, with fat layer exposed - Primary    Overview     HPI: 58 yr old male with history of DM-2 Presents with a left plantar diabetic ulcer over the 1st metatarsal head.  Patient states it has been there for about 3 months.  He did see Dr. Cochran and was referred back to wound care.  He was seen here back in 2019 for right foot ulcer.  Recent hemoglobin A1c was 6.7.  He does have good palpable pulses and good ABIs.  I did sharp debridement with curette today to include skin and subcutaneous tissue.  He will need to offload.  Silver alginate.  Left foot x-ray 03/12/2024 did not show any bony erosions or evidence of osteomyelitis.  Continue silver alginate.  DNA culture obtained.    Antibiotic compound being used.  He is also using a knee scooter so he does not put any weight.         Current Assessment & Plan       Wound is clean and granulating.  Wound is superficial.  No significant callus formation.  Debridement is needed.  Continue current dressing.  Patient has been compliant with offloading using scooter and shoe.  Patient has follow-up with endocrinologist this week regarding blood sugars.            See wound doc progress notes. Documents will be scanned.        Aidan Scott  [facility]

## 2024-06-13 ENCOUNTER — OFFICE VISIT (OUTPATIENT)
Dept: WOUND CARE | Facility: HOSPITAL | Age: 59
End: 2024-06-13
Attending: SURGERY
Payer: COMMERCIAL

## 2024-06-13 VITALS
TEMPERATURE: 98 F | HEART RATE: 75 BPM | RESPIRATION RATE: 18 BRPM | SYSTOLIC BLOOD PRESSURE: 129 MMHG | DIASTOLIC BLOOD PRESSURE: 77 MMHG

## 2024-06-13 DIAGNOSIS — L97.509 TYPE 2 DIABETES MELLITUS WITH FOOT ULCER, WITHOUT LONG-TERM CURRENT USE OF INSULIN: ICD-10-CM

## 2024-06-13 DIAGNOSIS — L97.522 PLANTAR ULCER, LEFT, WITH FAT LAYER EXPOSED: Primary | ICD-10-CM

## 2024-06-13 DIAGNOSIS — E11.621 TYPE 2 DIABETES MELLITUS WITH FOOT ULCER, WITHOUT LONG-TERM CURRENT USE OF INSULIN: ICD-10-CM

## 2024-06-13 PROCEDURE — 99499 UNLISTED E&M SERVICE: CPT | Mod: ,,, | Performed by: SURGERY

## 2024-06-13 PROCEDURE — 11042 DBRDMT SUBQ TIS 1ST 20SQCM/<: CPT

## 2024-06-13 NOTE — PROGRESS NOTES
[facility]  Wound Care  Progress Note    Problem List Items Addressed This Visit          Endocrine    Type 2 diabetes mellitus with foot ulcer, without long-term current use of insulin    Overview     HPI: 58 yr old male with history of DM-2 Presents with a left plantar diabetic ulcer over the 1st metatarsal head.  Patient states it has been there for about 3 months.  He did see Dr. Cochran and was referred back to wound care.  He was seen here back in 2019 for right foot ulcer.  Recent hemoglobin A1c was 6.7.  He does have good palpable pulses and good ABIs.  I did sharp debridement with curette today to include skin and subcutaneous tissue.  He will need to offload.  Silver alginate.  Left foot x-ray 03/12/2024 did not show any bony erosions or evidence of osteomyelitis.  Continue silver alginate.  DNA culture obtained.    Antibiotic compound being used.  He is also using a knee scooter so he does not put any weight.    Location: left First metatarsal head    Duration:  months    Context: DFU    Associated Signs & Symptoms: denies pain    Timing: n/a    Severity: n/a    Quality: n/a    Modifying Factors:  diabetes              Orthopedic    Plantar ulcer, left, with fat layer exposed - Primary    Overview     HPI: 58 yr old male with history of DM-2 Presents with a left plantar diabetic ulcer over the 1st metatarsal head.  Patient states it has been there for about 3 months.  He did see Dr. Cochran and was referred back to wound care.  He was seen here back in 2019 for right foot ulcer.  Recent hemoglobin A1c was 6.7.  He does have good palpable pulses and good ABIs.  I did sharp debridement with curette today to include skin and subcutaneous tissue.  He will need to offload.  Silver alginate.  Left foot x-ray 03/12/2024 did not show any bony erosions or evidence of osteomyelitis.  Continue silver alginate.  DNA culture obtained.    Antibiotic compound being used.  He is also using a knee  scooter so he does not put any weight.         Current Assessment & Plan     Wound is improved.  There was no evidence of pressure.    Continue debridement to maintain active phase wound healing.    Continue offloading.    Patient is expecting his ortho wedge healing shoe to arrive tomorrow.  He continues to utilize knee walker.            See wound doc progress notes. Documents will be scanned.        Gamaliel CANCINO Kong  [facility]

## 2024-06-13 NOTE — ASSESSMENT & PLAN NOTE
Wound is improved.  There was no evidence of pressure.    Continue debridement to maintain active phase wound healing.    Continue offloading.    Patient is expecting his ortho wedge healing shoe to arrive tomorrow.  He continues to utilize knee walker.

## 2024-06-20 ENCOUNTER — OFFICE VISIT (OUTPATIENT)
Dept: WOUND CARE | Facility: HOSPITAL | Age: 59
End: 2024-06-20
Attending: SURGERY
Payer: COMMERCIAL

## 2024-06-20 VITALS — HEART RATE: 80 BPM | SYSTOLIC BLOOD PRESSURE: 130 MMHG | DIASTOLIC BLOOD PRESSURE: 70 MMHG

## 2024-06-20 DIAGNOSIS — L97.522 PLANTAR ULCER, LEFT, WITH FAT LAYER EXPOSED: Primary | ICD-10-CM

## 2024-06-20 DIAGNOSIS — L97.509 TYPE 2 DIABETES MELLITUS WITH FOOT ULCER, WITHOUT LONG-TERM CURRENT USE OF INSULIN: ICD-10-CM

## 2024-06-20 DIAGNOSIS — E11.621 TYPE 2 DIABETES MELLITUS WITH FOOT ULCER, WITHOUT LONG-TERM CURRENT USE OF INSULIN: ICD-10-CM

## 2024-06-20 PROCEDURE — 11042 DBRDMT SUBQ TIS 1ST 20SQCM/<: CPT

## 2024-06-20 PROCEDURE — 99499 UNLISTED E&M SERVICE: CPT | Mod: ,,, | Performed by: SURGERY

## 2024-06-20 NOTE — ASSESSMENT & PLAN NOTE
Wound is improved.  There was no evidence of pressure.    Continue debridement to maintain active phase wound healing.    Continue offloading.  Continue antibiotic powder    Patient is now using the ortho wedge shoe.  He continues to utilize knee walker.

## 2024-06-20 NOTE — PROGRESS NOTES
[facility]  Wound Care  Progress Note    Problem List Items Addressed This Visit       Plantar ulcer, left, with fat layer exposed - Primary    Overview     HPI: 58 yr old male with history of DM-2 Presents with a left plantar diabetic ulcer over the 1st metatarsal head.  Patient states it has been there for about 3 months.  He did see Dr. Cochran and was referred back to wound care.  He was seen here back in 2019 for right foot ulcer.  Recent hemoglobin A1c was 6.7.  He does have good palpable pulses and good ABIs.  I did sharp debridement with curette today to include skin and subcutaneous tissue.  He will need to offload.  Silver alginate.  Left foot x-ray 03/12/2024 did not show any bony erosions or evidence of osteomyelitis.  Continue silver alginate.  DNA culture obtained.    Antibiotic compound being used.  He is also using a knee scooter so he does not put any weight.         Current Assessment & Plan     Wound is improved.  There was no evidence of pressure.    Continue debridement to maintain active phase wound healing.    Continue offloading.  Continue antibiotic powder    Patient is now using the ortho wedge shoe.  He continues to utilize knee walker.            See wound doc progress notes. Documents will be scanned.        Brandon Bee Jr  [facility]

## 2024-06-27 ENCOUNTER — OFFICE VISIT (OUTPATIENT)
Dept: WOUND CARE | Facility: HOSPITAL | Age: 59
End: 2024-06-27
Attending: SURGERY
Payer: COMMERCIAL

## 2024-06-27 VITALS
HEART RATE: 78 BPM | SYSTOLIC BLOOD PRESSURE: 127 MMHG | RESPIRATION RATE: 18 BRPM | DIASTOLIC BLOOD PRESSURE: 72 MMHG | TEMPERATURE: 98 F

## 2024-06-27 DIAGNOSIS — L97.522 PLANTAR ULCER, LEFT, WITH FAT LAYER EXPOSED: Primary | ICD-10-CM

## 2024-06-27 PROCEDURE — 11042 DBRDMT SUBQ TIS 1ST 20SQCM/<: CPT

## 2024-06-27 PROCEDURE — 99499 UNLISTED E&M SERVICE: CPT | Mod: ,,, | Performed by: SURGERY

## 2024-06-27 NOTE — ASSESSMENT & PLAN NOTE
Wound is improved.    Continue debridement to remove nonviable tissue and maintain active phase wound healing.    Continue offloading with ortho wedge healing shoe.

## 2024-06-27 NOTE — PROGRESS NOTES
[facility]  Wound Care  Progress Note    Problem List Items Addressed This Visit          Orthopedic    Plantar ulcer, left, with fat layer exposed - Primary    Overview     HPI: 58 yr old male with history of DM-2 Presents with a left plantar diabetic ulcer over the 1st metatarsal head.  Patient states it has been there for about 3 months.  He did see Dr. Cochran and was referred back to wound care.  He was seen here back in 2019 for right foot ulcer.  Recent hemoglobin A1c was 6.7.  He does have good palpable pulses and good ABIs.  I did sharp debridement with curette today to include skin and subcutaneous tissue.  He will need to offload.  Silver alginate.  Left foot x-ray 03/12/2024 did not show any bony erosions or evidence of osteomyelitis.  Continue silver alginate.  DNA culture obtained.    Antibiotic compound being used.  He is also using a knee scooter so he does not put any weight.         Current Assessment & Plan     Wound is improved.    Continue debridement to remove nonviable tissue and maintain active phase wound healing.    Continue offloading with ortho wedge healing shoe.            See wound doc progress notes. Documents will be scanned.        Gamaliel Kong  [facility]

## 2024-07-11 ENCOUNTER — OFFICE VISIT (OUTPATIENT)
Dept: WOUND CARE | Facility: HOSPITAL | Age: 59
End: 2024-07-11
Attending: SURGERY
Payer: COMMERCIAL

## 2024-07-11 VITALS
RESPIRATION RATE: 18 BRPM | HEART RATE: 77 BPM | TEMPERATURE: 98 F | DIASTOLIC BLOOD PRESSURE: 74 MMHG | SYSTOLIC BLOOD PRESSURE: 121 MMHG

## 2024-07-11 DIAGNOSIS — L97.509 TYPE 2 DIABETES MELLITUS WITH FOOT ULCER, WITHOUT LONG-TERM CURRENT USE OF INSULIN: ICD-10-CM

## 2024-07-11 DIAGNOSIS — E11.621 TYPE 2 DIABETES MELLITUS WITH FOOT ULCER, WITHOUT LONG-TERM CURRENT USE OF INSULIN: ICD-10-CM

## 2024-07-11 DIAGNOSIS — L97.522 PLANTAR ULCER, LEFT, WITH FAT LAYER EXPOSED: Primary | ICD-10-CM

## 2024-07-11 PROCEDURE — 99499 UNLISTED E&M SERVICE: CPT | Mod: ,,, | Performed by: SURGERY

## 2024-07-11 PROCEDURE — 11042 DBRDMT SUBQ TIS 1ST 20SQCM/<: CPT

## 2024-07-11 NOTE — ASSESSMENT & PLAN NOTE
Doing great.  It is markedly improved.  Continue debridement to maintain active phase wound healing.    Continue offloading  Continue antibiotic powder

## 2024-07-11 NOTE — PROGRESS NOTES
[facility]  Wound Care  Progress Note    Problem List Items Addressed This Visit          Endocrine    Type 2 diabetes mellitus with foot ulcer, without long-term current use of insulin    Overview     HPI: 58 yr old male with history of DM-2 Presents with a left plantar diabetic ulcer over the 1st metatarsal head.  Patient states it has been there for about 3 months.  He did see Dr. Cochran and was referred back to wound care.  He was seen here back in 2019 for right foot ulcer.  Recent hemoglobin A1c was 6.7.  He does have good palpable pulses and good ABIs.  I did sharp debridement with curette today to include skin and subcutaneous tissue.  He will need to offload.  Silver alginate.  Left foot x-ray 03/12/2024 did not show any bony erosions or evidence of osteomyelitis.  Continue silver alginate.  DNA culture obtained.    Antibiotic compound being used.  He is also using a knee scooter so he does not put any weight.    Location: left First metatarsal head    Duration:  months    Context: DFU    Associated Signs & Symptoms: denies pain    Timing: n/a    Severity: n/a    Quality: n/a    Modifying Factors:  diabetes              Orthopedic    Plantar ulcer, left, with fat layer exposed - Primary    Overview     HPI: 58 yr old male with history of DM-2 Presents with a left plantar diabetic ulcer over the 1st metatarsal head.  Patient states it has been there for about 3 months.  He did see Dr. Cochran and was referred back to wound care.  He was seen here back in 2019 for right foot ulcer.  Recent hemoglobin A1c was 6.7.  He does have good palpable pulses and good ABIs.  I did sharp debridement with curette today to include skin and subcutaneous tissue.  He will need to offload.  Silver alginate.  Left foot x-ray 03/12/2024 did not show any bony erosions or evidence of osteomyelitis.  Continue silver alginate.  DNA culture obtained.    Antibiotic compound being used.  He is also using a knee  scooter so he does not put any weight.         Current Assessment & Plan     Doing great.  It is markedly improved.  Continue debridement to maintain active phase wound healing.    Continue offloading  Continue antibiotic powder            See wound doc progress notes. Documents will be scanned.        Gamaliel Kong  [facility]

## 2024-07-18 ENCOUNTER — OFFICE VISIT (OUTPATIENT)
Dept: WOUND CARE | Facility: HOSPITAL | Age: 59
End: 2024-07-18
Attending: SURGERY
Payer: COMMERCIAL

## 2024-07-18 VITALS
TEMPERATURE: 98 F | SYSTOLIC BLOOD PRESSURE: 120 MMHG | DIASTOLIC BLOOD PRESSURE: 76 MMHG | HEART RATE: 72 BPM | RESPIRATION RATE: 18 BRPM

## 2024-07-18 DIAGNOSIS — L97.509 TYPE 2 DIABETES MELLITUS WITH FOOT ULCER, WITHOUT LONG-TERM CURRENT USE OF INSULIN: ICD-10-CM

## 2024-07-18 DIAGNOSIS — E11.621 TYPE 2 DIABETES MELLITUS WITH FOOT ULCER, WITHOUT LONG-TERM CURRENT USE OF INSULIN: ICD-10-CM

## 2024-07-18 DIAGNOSIS — L97.522 PLANTAR ULCER, LEFT, WITH FAT LAYER EXPOSED: Primary | ICD-10-CM

## 2024-07-18 PROCEDURE — 11042 DBRDMT SUBQ TIS 1ST 20SQCM/<: CPT

## 2024-07-18 PROCEDURE — 99499 UNLISTED E&M SERVICE: CPT | Mod: ,,, | Performed by: SURGERY

## 2024-07-18 NOTE — ASSESSMENT & PLAN NOTE
Wound continues to improve.    Continue debridement to maintain active phase wound healing.    Continue offloading.  Wound is expected to heal soon.

## 2024-07-18 NOTE — PROGRESS NOTES
[facility]  Wound Care  Progress Note    Problem List Items Addressed This Visit          Endocrine    Type 2 diabetes mellitus with foot ulcer, without long-term current use of insulin    Overview     HPI: 58 yr old male with history of DM-2 Presents with a left plantar diabetic ulcer over the 1st metatarsal head.  Patient states it has been there for about 3 months.  He did see Dr. Cochran and was referred back to wound care.  He was seen here back in 2019 for right foot ulcer.  Recent hemoglobin A1c was 6.7.  He does have good palpable pulses and good ABIs.  I did sharp debridement with curette today to include skin and subcutaneous tissue.  He will need to offload.  Silver alginate.  Left foot x-ray 03/12/2024 did not show any bony erosions or evidence of osteomyelitis.  Continue silver alginate.  DNA culture obtained.    Antibiotic compound being used.  He is also using a knee scooter so he does not put any weight.    Location: left First metatarsal head    Duration:  months    Context: DFU    Associated Signs & Symptoms: denies pain    Timing: n/a    Severity: n/a    Quality: n/a    Modifying Factors:  diabetes              Orthopedic    Plantar ulcer, left, with fat layer exposed - Primary    Overview     HPI: 58 yr old male with history of DM-2 Presents with a left plantar diabetic ulcer over the 1st metatarsal head.  Patient states it has been there for about 3 months.  He did see Dr. Cochran and was referred back to wound care.  He was seen here back in 2019 for right foot ulcer.  Recent hemoglobin A1c was 6.7.  He does have good palpable pulses and good ABIs.  I did sharp debridement with curette today to include skin and subcutaneous tissue.  He will need to offload.  Silver alginate.  Left foot x-ray 03/12/2024 did not show any bony erosions or evidence of osteomyelitis.  Continue silver alginate.  DNA culture obtained.    Antibiotic compound being used.  He is also using a knee  scooter so he does not put any weight.         Current Assessment & Plan     Wound continues to improve.    Continue debridement to maintain active phase wound healing.    Continue offloading.  Wound is expected to heal soon.            See wound doc progress notes. Documents will be scanned.        Gamaliel Kong  [facility]